# Patient Record
(demographics unavailable — no encounter records)

---

## 2018-11-14 NOTE — CT
NONCONTRAST ENHANCED CT IMAGES ABDOMEN AND PELVIS:

11/14/18

 

Comparison made to previous exam from 4/14/17.

 

HISTORY: 

Hematuria. Left sided pain. 

 

Noncontrast enhanced CT images of the abdomen and pelvis demonstrates the lung bases to be unremarkab
le. 

 

No evidence of free intraperitoneal air seen. 

 

The liver, spleen, pancreas, gallbladder, adrenal glands and right kidney are unremarkable. 

 

There is a moderate degree of left sided hydronephrosis. 

 

There is a 6 x 7 x 12 mm proximal left ureteral calculus at the left  ureteropelvic junction. This is
 a large obstructing calculus resulting in the left sided hydronephrosis. More distally, the left ure
ter is decompressed. 

 

The urinary bladder is unremarkable. 

 

IMPRESSION:  

6 x 7 x 12 mm obstructing proximal left ureteral calculus. 

 

POS: [facility]

## 2018-11-17 NOTE — OP
DATE OF PROCEDURE:  11/16/2018

 

PREOPERATIVE DIAGNOSIS:  Left ureteral stone.

 

POSTOPERATIVE DIAGNOSIS:  Left ureteral stone.

 

PROCEDURE:  Cystoscopy, left retrograde pyelogram, insertion of ureteral stent 6 x 26.

 

SURGEON:  Rosa Domínguez M.D.

 

ANESTHESIA:  General (desk airway).

 

FINDINGS:  Adequate placement of the stent after passing a wedged stone, mild hydronephrosis.

 

COMPLICATIONS:  No complications.

 

Drain remaining, 6 x 28 Double J.

 

SPECIMENS:  Urine from left renal pelvis.

 

ESTIMATED BLOOD LOSS:  No blood loss.

 

INDICATIONS:  This is a 51-year-old male who was seen in the ER 2 days prior and presented to [facility] with concerns for obstructing left stone and persistent renal colic, so elected for urgent stent.

 

The patient was brought to the room by Anesthesia and laid on table in supine position.  After receiv
ing general anesthetic, the patient was placed in lithotomy position.  Perineum was prepped and drape
d in sterile fashion.  Using a 21-Hebrew cystoscope and a 30 degree lens, urethra was traversed and t
he bladder inspected.  The prostatic urethra showed no significant hypertrophy and was relatively killian
rt.  The bladder itself was without lesion.  The ureteral orifices were in normal position and the le
ft was intubated with a Pollack catheter up to the level of stone where the Pollack catheter could no
t pass the stone, so a wire was used to pass the stone and then with significant pressure due to the 
wedging of the stone, the Pollack catheter was advanced beyond the stone over the wire and into the r
enal pelvis where hydronephrotic drip was noted.  Approximately 6 mL of pink-tinged urine was extract
ed and sent for specimen.  Retrograde pyelogram performed revealed mild hydronephrosis.  Measurements
 were taken and then a 6 x 26 double-J was placed over a wire after removing the Pollack.  A good coi
l was visualized in the renal pelvis via fluoroscopy.  A good coil was visualized in the bladder via 
cystoscopy.  The scope was broken apart, bladder drained and then removed in its entirety.  The patie
nt tolerated procedure well and was then awakened and transferred to PACU in stable condition.

## 2018-11-21 NOTE — EKG
Test Reason : PREOP

Blood Pressure : ***/*** mmHG

Vent. Rate : 083 BPM     Atrial Rate : 083 BPM

   P-R Int : 162 ms          QRS Dur : 100 ms

    QT Int : 362 ms       P-R-T Axes : 067 041 054 degrees

   QTc Int : 425 ms

 

Normal sinus rhythm

Normal ECG

When compared with ECG of 12-MAR-2016 15:12,

No significant change was found

Confirmed by DR. JOSETTE EDWARDS ([id number]) on 11/21/2018 8:14:40 AM

 

Referred By:  LANCE           Confirmed By:DR. JOSETTE EDWARDS

## 2018-11-27 NOTE — OP
DATE OF PROCEDURE:  11/27/2018

 

PREOPERATIVE DIAGNOSIS:  Left ureteral stone.

 

POSTOPERATIVE DIAGNOSIS:  Left ureteral stone.

 

PROCEDURE:  Cystoscopy, left ureteroscopy, left pyeloscopy, holmium laser lithotripsy, stone basketin
g, stent placement 6 x 26.

 

SURGEON:  Rosa Domínguez M.D.

 

ANESTHESIA:  General with endotracheal tube.

 

FINDINGS:  Adequate fragmentation of the stone as it migrated into the kidney itself with extraction 
of all sizable fragments.

 

SPECIMENS:  Stone.

 

COMPLICATIONS:  None.

 

DRAIN REMAINING:  A 6 x 26 double-J with a string.

 

INDICATIONS:  The patient is a 51-year-old male who is followed in the office and seen prior to the [facility] break and had an urgent stent placed for a large obstructing left ureteral stone.  Given 
the density of the stone by CT scan I opted for laser lithotripsy via ureteroscopy versus ESWL in hop
es that that would limit the procedures required and so we proceeded.

 

TECHNIQUE:  The patient was brought into the room by Anesthesia, laid on the table in supine position
.  After a general anesthetic his legs placed in lithotomy position and his perineum was prepped and 
draped in sterile fashion.  The left leg was lowered.  The right leg was elevated and then a cystosco
pe was used to enter the bladder and grabbed the stent and brought out through the urethral meatus.  
A wire was placed through this and then the old stent was removed leaving the wire in place.  Then, t
he rigid ureteroscope was used to try to get to the level of stone; however, the angulation at the pe
lvic brim was unable to accommodate the rigid scope so the scope was removed and then a double lumen 
catheter was placed in order to leave a second Super Stiff wire and then a larger ureteral catheter w
as placed over that wire and up to the level of the stone.  So now there was a safety wire and the Fuller
per Stiff wire which was removed leaving the ureteral catheter in place, approximately 2-3 cm below t
he stone.  Then, the flexible ureteroscope was used to go over the Super Stiff wire before removing i
t to the level of the stone where holmium laser lithotripsy was performed.  Dusting was only partiall
y effective so I switched to fragmentation; however, by this point, the stone and gone into the kidne
y and into one of the calices that was chased into there, unable to adequately fragment it into multi
ple smaller pieces which were then extracted.  One was quite large and brought to the ureteral cathet
er and then the holmium laser was used to fragment that again as it migrated again into the renal pel
vis, but it was adequately fragmented and then all fragments were removed.  I returned to the julieta w
here a good portion of fragmentation returned and at this point, there was only debris and some mucou
s, but anything significant was extracted and sent out for specimen and a final pass into the renal u
nit and ureter showed no significant fragments remaining, so at this point, the ureteral catheter was
 removed leaving the safety wire intact and then that was back fed over the cystoscope and a 6 x 26 d
ouble-J was placed with the coil visualized in the renal pelvis, but somewhat extended to the upper p
ole and a coil visualized in the bladder via cystoscopy.  Scope was broken apart, bladder drained and
 removed carefully so as not to disturb the string which was then secured to the patient's penis.  Th
e patient was then awakened and transferred to PACU in stable condition.

## 2018-11-27 NOTE — RAD
KUB:

 

Date:  11/27/18 

 

INDICATION:

Preop evaluation. 

 

COMPARISON:  

CT abdomen and pelvis without IV contrast dated 11/14/18. 

 

FINDINGS:

There has been interval placement of a left ureteral stent. The proximal left ureteral stone seen at 
the L3 transverse process level is not appreciably changed. The stone measures approximately 1.2 x 0.
7 cm. Small phleboliths seen within the lower right hemipelvis. Bowel gas pattern is unobstructed. No
 acute osseous abnormality is evident. 

 

IMPRESSION: 

Stable proximal left ureteral stone with left ureteral stent placement. 

 

 

POS: [facility]